# Patient Record
Sex: MALE | Race: OTHER | NOT HISPANIC OR LATINO | URBAN - METROPOLITAN AREA
[De-identification: names, ages, dates, MRNs, and addresses within clinical notes are randomized per-mention and may not be internally consistent; named-entity substitution may affect disease eponyms.]

---

## 2023-02-11 ENCOUNTER — EMERGENCY (EMERGENCY)
Facility: HOSPITAL | Age: 38
LOS: 1 days | Discharge: HOME | End: 2023-02-11
Admitting: EMERGENCY MEDICINE
Payer: SELF-PAY

## 2023-02-11 VITALS
SYSTOLIC BLOOD PRESSURE: 142 MMHG | TEMPERATURE: 98 F | HEART RATE: 135 BPM | DIASTOLIC BLOOD PRESSURE: 85 MMHG | OXYGEN SATURATION: 97 % | RESPIRATION RATE: 16 BRPM | WEIGHT: 210.1 LBS | HEIGHT: 74 IN

## 2023-02-11 LAB
ALBUMIN SERPL ELPH-MCNC: 2.9 G/DL — LOW (ref 3.4–5)
ALP SERPL-CCNC: 89 U/L — SIGNIFICANT CHANGE UP (ref 40–120)
ALT FLD-CCNC: 24 U/L — SIGNIFICANT CHANGE UP (ref 12–42)
ANION GAP SERPL CALC-SCNC: 11 MMOL/L — SIGNIFICANT CHANGE UP (ref 9–16)
AST SERPL-CCNC: 34 U/L — SIGNIFICANT CHANGE UP (ref 15–37)
BASOPHILS # BLD AUTO: 0.03 K/UL — SIGNIFICANT CHANGE UP (ref 0–0.2)
BASOPHILS NFR BLD AUTO: 0.5 % — SIGNIFICANT CHANGE UP (ref 0–2)
BILIRUB SERPL-MCNC: 0.4 MG/DL — SIGNIFICANT CHANGE UP (ref 0.2–1.2)
BUN SERPL-MCNC: 14 MG/DL — SIGNIFICANT CHANGE UP (ref 7–23)
CALCIUM SERPL-MCNC: 9.3 MG/DL — SIGNIFICANT CHANGE UP (ref 8.5–10.5)
CHLORIDE SERPL-SCNC: 98 MMOL/L — SIGNIFICANT CHANGE UP (ref 96–108)
CO2 SERPL-SCNC: 21 MMOL/L — LOW (ref 22–31)
CREAT SERPL-MCNC: 0.82 MG/DL — SIGNIFICANT CHANGE UP (ref 0.5–1.3)
D DIMER BLD IA.RAPID-MCNC: 812 NG/ML DDU — HIGH
EGFR: 115 ML/MIN/1.73M2 — SIGNIFICANT CHANGE UP
EOSINOPHIL # BLD AUTO: 0.01 K/UL — SIGNIFICANT CHANGE UP (ref 0–0.5)
EOSINOPHIL NFR BLD AUTO: 0.2 % — SIGNIFICANT CHANGE UP (ref 0–6)
GLUCOSE SERPL-MCNC: 116 MG/DL — HIGH (ref 70–99)
HCT VFR BLD CALC: 39.8 % — SIGNIFICANT CHANGE UP (ref 39–50)
HGB BLD-MCNC: 12.8 G/DL — LOW (ref 13–17)
IMM GRANULOCYTES NFR BLD AUTO: 0.5 % — SIGNIFICANT CHANGE UP (ref 0–0.9)
LYMPHOCYTES # BLD AUTO: 1.48 K/UL — SIGNIFICANT CHANGE UP (ref 1–3.3)
LYMPHOCYTES # BLD AUTO: 22.4 % — SIGNIFICANT CHANGE UP (ref 13–44)
MCHC RBC-ENTMCNC: 26.3 PG — LOW (ref 27–34)
MCHC RBC-ENTMCNC: 32.2 GM/DL — SIGNIFICANT CHANGE UP (ref 32–36)
MCV RBC AUTO: 81.9 FL — SIGNIFICANT CHANGE UP (ref 80–100)
MONOCYTES # BLD AUTO: 0.45 K/UL — SIGNIFICANT CHANGE UP (ref 0–0.9)
MONOCYTES NFR BLD AUTO: 6.8 % — SIGNIFICANT CHANGE UP (ref 2–14)
NEUTROPHILS # BLD AUTO: 4.61 K/UL — SIGNIFICANT CHANGE UP (ref 1.8–7.4)
NEUTROPHILS NFR BLD AUTO: 69.6 % — SIGNIFICANT CHANGE UP (ref 43–77)
NRBC # BLD: 0 /100 WBCS — SIGNIFICANT CHANGE UP (ref 0–0)
NT-PROBNP SERPL-SCNC: 103 PG/ML — SIGNIFICANT CHANGE UP
PLATELET # BLD AUTO: 160 K/UL — SIGNIFICANT CHANGE UP (ref 150–400)
POTASSIUM SERPL-MCNC: 5.4 MMOL/L — HIGH (ref 3.5–5.3)
POTASSIUM SERPL-SCNC: 5.4 MMOL/L — HIGH (ref 3.5–5.3)
PROT SERPL-MCNC: 9.3 G/DL — HIGH (ref 6.4–8.2)
RBC # BLD: 4.86 M/UL — SIGNIFICANT CHANGE UP (ref 4.2–5.8)
RBC # FLD: 13.5 % — SIGNIFICANT CHANGE UP (ref 10.3–14.5)
SODIUM SERPL-SCNC: 130 MMOL/L — LOW (ref 132–145)
TROPONIN I, HIGH SENSITIVITY RESULT: 5.4 NG/L — SIGNIFICANT CHANGE UP
TSH SERPL-MCNC: 1.18 UIU/ML — SIGNIFICANT CHANGE UP (ref 0.36–3.74)
WBC # BLD: 6.61 K/UL — SIGNIFICANT CHANGE UP (ref 3.8–10.5)
WBC # FLD AUTO: 6.61 K/UL — SIGNIFICANT CHANGE UP (ref 3.8–10.5)

## 2023-02-11 PROCEDURE — 93971 EXTREMITY STUDY: CPT | Mod: 26,RT

## 2023-02-11 PROCEDURE — 71275 CT ANGIOGRAPHY CHEST: CPT | Mod: 26

## 2023-02-11 PROCEDURE — 27786 TREATMENT OF ANKLE FRACTURE: CPT | Mod: 54

## 2023-02-11 PROCEDURE — 99285 EMERGENCY DEPT VISIT HI MDM: CPT | Mod: 57

## 2023-02-11 PROCEDURE — 73610 X-RAY EXAM OF ANKLE: CPT | Mod: 26,RT

## 2023-02-11 RX ORDER — SODIUM CHLORIDE 9 MG/ML
1000 INJECTION INTRAMUSCULAR; INTRAVENOUS; SUBCUTANEOUS ONCE
Refills: 0 | Status: COMPLETED | OUTPATIENT
Start: 2023-02-11 | End: 2023-02-11

## 2023-02-11 RX ADMIN — SODIUM CHLORIDE 1000 MILLILITER(S): 9 INJECTION INTRAMUSCULAR; INTRAVENOUS; SUBCUTANEOUS at 22:30

## 2023-02-11 NOTE — ED ADULT NURSE NOTE - OBJECTIVE STATEMENT
38y male, pmh of PSA (last use October 2022), here for right ankle injury. About 3 days ago pt stepped off a curb and twisted his ankle inward. Pt didn't seek help initially because he has hurt his ankle in the past and thought it would heal. However the swelling and pain have progressed and now pt has a difficult time bearing weight. Also noted to have two open wounds that he is taking bactrim and clindamycin for.

## 2023-02-11 NOTE — ED ADULT NURSE NOTE - CHIEF COMPLAINT QUOTE
male patient here for eval of right ankle pain s/p stepping off a curb wrong x 3 days. patient states his ankle twisted inward and has since been having problems bearing weight on it and ROM is affected. patient states he felt a twinge when in incident occurred. patient notes he had his ankle fused in 2015. patient also notes that he has two cuts on his foot in which he takes bactrim and clindamycin for x 1.5 weeks.

## 2023-02-11 NOTE — ED ADULT TRIAGE NOTE - CHIEF COMPLAINT QUOTE
male patient here for eval of right ankle pain s/p stepping off a curb wrong x 3 days. patient states his ankle twisted inward and has since been having problems bearing weight on it and ROM is affected. patient states he felt a twinge when in incident occurred. patient notes he had his ankle fused in 2015 male patient here for eval of right ankle pain s/p stepping off a curb wrong x 3 days. patient states his ankle twisted inward and has since been having problems bearing weight on it and ROM is affected. patient states he felt a twinge when in incident occurred. patient notes he had his ankle fused in 2015. patient also notes that he has two cuts on his foot in which he takes bactrim and clindamycin for x 1.5 weeks.

## 2023-02-12 VITALS
OXYGEN SATURATION: 95 % | RESPIRATION RATE: 17 BRPM | SYSTOLIC BLOOD PRESSURE: 131 MMHG | HEART RATE: 104 BPM | TEMPERATURE: 98 F | DIASTOLIC BLOOD PRESSURE: 82 MMHG

## 2023-02-12 NOTE — ED PROVIDER NOTE - PATIENT PORTAL LINK FT
You can access the FollowMyHealth Patient Portal offered by [facility] by registering at the following website: [email or web address]. By joining [employer]’s FollowMyHealth portal, you will also be able to view your health information using other applications (apps) compatible with our system.

## 2023-02-12 NOTE — ED ADULT NURSE REASSESSMENT NOTE - NS ED NURSE REASSESS COMMENT FT1
Patient reevaluated by MD; leaving AMA; prior to imaging results can return. Patient alert verbal oriented x3; ambulatory w/ boot and crutches. Left ED safely without complaint. Discharge paperwork provided. Patient reevaluated by MD; cleared for discharge. Patient alert verbal oriented x3; ambulatory w/ boot and crutches. Left ED safely without complaint. Discharge paperwork provided.
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
## 2023-02-12 NOTE — ED PROVIDER NOTE - MUSCULOSKELETAL, MLM
range of motion somewhat limited 2/2 pain of right ankle, + swelling noted with 3 chronic appearing wounds without surrounding erythema, good granulation tissue, no muscle or joint tenderness. 3+ pitting edema, 2+ dp/pt pulses equal bilat, compartments soft.

## 2023-02-12 NOTE — ED PROVIDER NOTE - CARE PROVIDER_API CALL
Billy Spence)  Orthopaedic Surgery Surgery  [address] [place] [address]  Phone: [phone]  Fax: [phone]  Follow Up Time:

## 2023-02-12 NOTE — ED PROVIDER NOTE - NSFOLLOWUPINSTRUCTIONS_ED_ALL_ED_FT
Fracture    A fracture is a break in one of your bones. This can occur from a variety of injuries, especially traumatic ones. Symptoms include pain, bruising, or swelling. Do not use the injured limb. If a fracture is in one of the bones below your waist, do not put weight on that limb unless instructed to do so by your healthcare provider. Crutches or a cane may have been provided. A splint or cast may have been applied by your health care provider. Make sure to keep it dry and follow up with an orthopedist as instructed.    SEEK IMMEDIATE MEDICAL CARE IF YOU HAVE ANY OF THE FOLLOWING SYMPTOMS: numbness, tingling, increasing pain, or weakness in any part of the injured limb.     YOUR XRAY SHOWS A BROKEN SCREW BUT IT IS ON THE OPPOSITE SIDE OF WHERE YOU ARE HAVING YOUR PAIN SO WE ARE NOT SURE IF THAT IS A PART OF YOUR CURRENT INJURY OR NOT. YOU WERE PLACED IN A CAM BOOT AND SHOULD BE WEIGHT BEARING AS TOLERATED. CALL DR. GARZA ON MONDAY TO SCHEDULE YOUR APPOINTMENT FOR MONDAY.     ALSO YOUR HEART RATE IS ELEVATED. AS DISCUSSED, IN COMBINATION WITH YOUR LEG SWELLING, WE ARE CONCERNED FOR THE POTENTIAL OF A BLOOD CLOT. THE PRELIMINARY REPORTS ARE NEGATIVE BUT THESE ARE NOT FINAL READS AND A MISSED BLOOD CLOT COULD CAUSE LOSS OF CONCIOUSNESS AND DEATH. I WILL TRY TO CALL YOU IN THE MORNING BUT IF YOU DO NOT HEAR FROM ME PLEASE CALL THE ED FOR YOUR FINAL RESULTS [phone]. Fracture    A fracture is a break in one of your bones. This can occur from a variety of injuries, especially traumatic ones. Symptoms include pain, bruising, or swelling. Do not use the injured limb. If a fracture is in one of the bones below your waist, do not put weight on that limb unless instructed to do so by your healthcare provider. Crutches or a cane may have been provided. A splint or cast may have been applied by your health care provider. Make sure to keep it dry and follow up with an orthopedist as instructed.    SEEK IMMEDIATE MEDICAL CARE IF YOU HAVE ANY OF THE FOLLOWING SYMPTOMS: numbness, tingling, increasing pain, or weakness in any part of the injured limb.     YOUR XRAY SHOWS A BROKEN SCREW BUT IT IS ON THE OPPOSITE SIDE OF WHERE YOU ARE HAVING YOUR PAIN SO WE ARE NOT SURE IF THAT IS A PART OF YOUR CURRENT INJURY OR NOT. YOU WERE PLACED IN A CAM BOOT AND SHOULD BE WEIGHT BEARING AS TOLERATED. CALL DR. GARZA ON MONDAY TO SCHEDULE YOUR APPOINTMENT FOR MONDAY.     ALSO YOUR HEART RATE IS ELEVATED. AS DISCUSSED, IN COMBINATION WITH YOUR LEG SWELLING, WE ARE CONCERNED FOR THE POTENTIAL OF A BLOOD CLOT. THE PRELIMINARY REPORTS ARE NEGATIVE BUT THESE ARE NOT FINAL READS AND A MISSED BLOOD CLOT COULD CAUSE LOSS OF CONSCIOUSNESS AND DEATH. I WILL TRY TO CALL YOU IN THE MORNING BUT IF YOU DO NOT HEAR FROM ME PLEASE CALL THE ED FOR YOUR FINAL RESULTS [phone].

## 2023-02-12 NOTE — ED PROVIDER NOTE - PROGRESS NOTE DETAILS
final reads still pending. given suboptimal quality of CTPE recommended staying for final result but pt states his wife is locked out with their child and he has the keys and needs to go let her in. advised pt to call for results in the morning. he also consents to have VM left with results on his phone.
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
## 2023-02-14 DIAGNOSIS — R00.0 TACHYCARDIA, UNSPECIFIED: ICD-10-CM

## 2023-02-14 DIAGNOSIS — F17.200 NICOTINE DEPENDENCE, UNSPECIFIED, UNCOMPLICATED: ICD-10-CM

## 2023-02-14 DIAGNOSIS — M25.571 PAIN IN RIGHT ANKLE AND JOINTS OF RIGHT FOOT: ICD-10-CM

## 2023-02-14 DIAGNOSIS — S82.891A OTHER FRACTURE OF RIGHT LOWER LEG, INITIAL ENCOUNTER FOR CLOSED FRACTURE: ICD-10-CM
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
## 2024-01-19 NOTE — ED PROCEDURE NOTE - CPROC ED INFORMED CONSENT1
Benefits, risks, and possible complications of procedure explained to patient/caregiver who verbalized understanding and gave verbal consent. 100.4